# Patient Record
Sex: FEMALE | Race: WHITE | URBAN - METROPOLITAN AREA
[De-identification: names, ages, dates, MRNs, and addresses within clinical notes are randomized per-mention and may not be internally consistent; named-entity substitution may affect disease eponyms.]

---

## 2021-09-01 ENCOUNTER — EMPLOYEE HEALTH (OUTPATIENT)
Dept: OTHER | Facility: HOSPITAL | Age: 56
End: 2021-09-01
Attending: PREVENTIVE MEDICINE

## 2021-09-01 DIAGNOSIS — Z11.1 SCREENING-PULMONARY TB: Primary | ICD-10-CM

## 2021-09-01 DIAGNOSIS — Z01.84 IMMUNITY STATUS TESTING: ICD-10-CM

## 2021-09-01 LAB
RUBV IGG SER QL: NEGATIVE
RUBV IGG SER-ACNC: <0.2 IU/ML (ref 10–?)

## 2021-09-01 PROCEDURE — 86735 MUMPS ANTIBODY: CPT

## 2021-09-01 PROCEDURE — 86480 TB TEST CELL IMMUN MEASURE: CPT

## 2021-09-01 PROCEDURE — 86765 RUBEOLA ANTIBODY: CPT

## 2021-09-01 PROCEDURE — 86787 VARICELLA-ZOSTER ANTIBODY: CPT

## 2021-09-01 PROCEDURE — 86762 RUBELLA ANTIBODY: CPT

## 2021-09-02 LAB
M TB IFN-G CD4+ T-CELLS BLD-ACNC: 0.02 IU/ML
M TB TUBERC IFN-G BLD QL: NEGATIVE
M TB TUBERC IGNF/MITOGEN IGNF CONTROL: >10 IU/ML
QFT TB1 AG MINUS NIL: 0.3 IU/ML
QFT TB2 AG MINUS NIL: 0.17 IU/ML

## 2021-09-03 LAB
MEV IGG SER-ACNC: >300 AU/ML (ref 16.5–?)
MUV IGG SER IA-ACNC: <5 AU/ML (ref 11–?)
VZV IGG SER IA-ACNC: 649.3 (ref 165–?)

## 2021-09-10 ENCOUNTER — APPOINTMENT (OUTPATIENT)
Dept: OTHER | Facility: HOSPITAL | Age: 56
End: 2021-09-10
Attending: PREVENTIVE MEDICINE

## 2021-12-01 ENCOUNTER — TELEPHONE (OUTPATIENT)
Dept: INTERNAL MEDICINE CLINIC | Facility: HOSPITAL | Age: 56
End: 2021-12-01

## 2021-12-01 DIAGNOSIS — Z20.822 SUSPECTED COVID-19 VIRUS INFECTION: Primary | ICD-10-CM

## 2021-12-01 NOTE — TELEPHONE ENCOUNTER
Department: EDT -RUP   [] [address] [facility]  [x]BAO   [] [address] [facility]    Dept Manager/Supervisor/team or clinical lead: Ryan White    Position:  [] MD     [] RN     [] Respiratory Therapist     [] PCT     [] PSR      [x] [facility]    HAVE YOU RECEIVED THE COVID-19 Vaccine?  Yes [x] you worked?  12/1/21  When are you next scheduled to work? 12/3/21    Did you have close contact with someone on your unit while not wearing a mask? (e.g., during meal breaks):  Yes []   No [x]    If yes, who: was distanced from other workers by 5-6 ft when [facility] 3-5 days after exposure.                                                  COVID-19 testing ordered: [] Rapid    [x] [facility]    Date test is to be taken:    12/2/2021  []  Outside testing       [x] Manager notified    INSTRUCTIONS PROVIDED:    [x] Em

## 2021-12-16 ENCOUNTER — TELEPHONE (OUTPATIENT)
Dept: INTERNAL MEDICINE CLINIC | Facility: HOSPITAL | Age: 56
End: 2021-12-16

## 2021-12-16 DIAGNOSIS — Z20.822 SUSPECTED COVID-19 VIRUS INFECTION: Primary | ICD-10-CM

## 2021-12-16 NOTE — TELEPHONE ENCOUNTER
Department: Eating Disorder OP                           [] [facility]  [x]BAO   [] [address] [place] [address]    Dept Manager/Supervisor/team or clinical lead: Lauri Alba    Position:  [] MD     [] RN     [] Respiratory Therapist     [] PCT     [] PSR      [x] Ursula Little     [] MA [] Othe scheduled to work? 12/17/2021    Did you have close contact with someone on your unit while not wearing a mask? (e.g., during meal breaks):  Yes [x]   No []    If yes, who:   Do you share a workspace? Yes [x]   No []       If yes, with whom?    Do you have 12/15/2021. Instructed to obtain results and e-mail to [email or web address] before returning to work.                 COVID-19 testing ordered: [x] Rapid    [] [facility]    Date test is to be taken:    12/15/2021    [x]  Outside testing       [x] Manager

## 2021-12-17 NOTE — TELEPHONE ENCOUNTER
Manager January called, asked about Senia' status, spoke with Senia who reports that she had a temperature of 100.8 last night and text her manager Joesph Bussing with that information she would be off due to fever.  Senia had outside testing on 12/15/2021:Rapid that

## 2021-12-22 NOTE — TELEPHONE ENCOUNTER
Outside Covid Testing done    Results and RTW guidelines:    COVID RESULT reported:      Test type:    [x] Rapid outside         [] PCR outside    Date of test: 12/20/2021    Test location: [address] [facility]          [] Result viewed in Epic with verb COVID    4. Within 20 days of intubation for COVID    5.  Still has a fever, vomiting or diarrhea   - Keep communication open with management about RTW and if symptoms worsen     Notes:     RTW PLAN:    [x] RTW 10 days with clearance from [facility]- call for appt

## 2021-12-23 ENCOUNTER — TELEPHONE (OUTPATIENT)
Dept: INTERNAL MEDICINE CLINIC | Facility: HOSPITAL | Age: 56
End: 2021-12-23